# Patient Record
Sex: FEMALE | Race: WHITE | NOT HISPANIC OR LATINO | URBAN - METROPOLITAN AREA
[De-identification: names, ages, dates, MRNs, and addresses within clinical notes are randomized per-mention and may not be internally consistent; named-entity substitution may affect disease eponyms.]

---

## 2017-11-15 PROBLEM — Z00.00 ROUTINE HEALTH MAINTENANCE: Status: ACTIVE | Noted: 2017-11-15

## 2017-11-15 NOTE — PROGRESS NOTES
Isai Byrd is a 34year old female. Patient presents with:  Physical: Pt presents for PE / [address] [place] [address] 10/6/2016 / Pap with GYN / Denies Flu vac       HPI:   Isai Byrd is a 34year old female who presents for a complete physical exam.   The lexapro i otherwise  SKIN: denies any unusual skin lesions  EYES:denies blurred vision or double vision  HEENT: denies nasal congestion, sinus pain or ST  LUNGS: denies shortness of breath with exertion or cough  CARDIOVASCULAR: denies chest pain, pressure, or palpi

## 2017-12-22 NOTE — TELEPHONE ENCOUNTER
I sent in rx for effexor 75mg daily. She should have tapered off lexapro by now per Dr. Hemant Awan recommendation. Will need to check with patient whether she needs the lexapro or not. Attempted to call and LMTCB.

## 2017-12-22 NOTE — TELEPHONE ENCOUNTER
Pt. Is requesting a refill on: Escitalopram & Venlafaxine  Pt. Moved and can't find her Rx's please advise  Ph. # [phone]   [facility] ph. # [phone]   Pt.  Is aware Dr. Jeancarlos Diaz is off & would like on-call to advise

## 2017-12-27 NOTE — TELEPHONE ENCOUNTER
Patient called back and states that she is still in the process of weaning off of Lexapro. At this time she is requesting 7 days worth of medication to finish weaning. eRx for Lexapro 10mg x 7days sent to [place].

## 2018-01-22 NOTE — TELEPHONE ENCOUNTER
[facility] requesting NEW RX for:  Venlafaxine 75MG ER CAP  Tasked to [employer]

## 2018-04-09 NOTE — TELEPHONE ENCOUNTER
Patient is tapering off Venlafaxine. Needs new dosage called into pharmacy. [facility] [phone]  Routed to Rx.

## 2018-04-11 NOTE — TELEPHONE ENCOUNTER
Called patient. She wants to get off of Venlafaxine completely. She does not think that it is helping her. She is wondering how to best taper off of it. She has been taking 75 mg capsules daily.     To Dr Cayla Del Valle please advise

## 2018-04-12 NOTE — TELEPHONE ENCOUNTER
Rec decreasing to the 37.5mg capsules -- then take 1 tab daily for 2 weeks, then 1 tab every other day x 2 weeks, then 1 tab every 2 days for 2 weeks, then stop (order pended)

## 2018-10-15 NOTE — TELEPHONE ENCOUNTER
Sx onset: Saturday night. Reports flu like symptoms. C/o chills, sweating, body aches, and dry cough. Felt feverish yesterday. Doesn't have a thermometer to take her temp. Denies nasal or sinus congestion.      To on call MD: send to [facility] on [address]

## 2018-10-15 NOTE — TELEPHONE ENCOUNTER
Called and Relayed MD's message to patient---verbalized understanding. States she feels very weak and doesn't think that she would be able to come in for an appt tomorrow. She did agree to schedule an appt for 3:30 and see how she feels tomorrow.  She may c

## 2018-10-15 NOTE — TELEPHONE ENCOUNTER
Pt has come down with the flu and is wondering if Dr Heri Becerra can prescribe her something over the phone.  Best call back is [phone]

## 2018-10-15 NOTE — TELEPHONE ENCOUNTER
From what I understand there is no flu going around just yet, doctor blackmon is in tomorrow, I recommend we try to get her in tomorrow with either her myself to be evaluated.  Also maybe a flu shot

## 2018-10-16 NOTE — PROGRESS NOTES
Greta Romero is a 28year old female. Patient presents with:  Cough: Dry cough, sweats at night, chills and body aches since last Friday. She is taking tylenol. HPI:     Sick since last Friday. Developed fatigue and chills, tactile fever.   The nex auscultation, no crackles or wheezing. CARDIO: RRR, normal S1S2, without murmur or gallop  GI: soft, NT, ND, NABS, no HSM    ASSESSMENT AND PLAN:     1. URI, acute  prob viral.  Seems to be improving today. Cont to push fluids. Prn tylenol.   Call if sx

## 2020-12-08 NOTE — TELEPHONE ENCOUNTER
Patient is calling on Saturday evening her back went out  She is still very uncomfortable, she can't lay flat or move with quick motion  she has been using a heating pad    Patient would like to get a rx for a muscle relaxer called into [facility] [address]

## 2020-12-08 NOTE — TELEPHONE ENCOUNTER
I spoke with patient. She is having some tightness in her back since this weekend. Her niece was going up stairs and she made a sudden movement and then felt her back tighten up. She has tried heating pad, tylenol and aleve.  Last night she could not sleep

## 2020-12-09 NOTE — TELEPHONE ENCOUNTER
Sorry forgot to route this to you yesterday. Rx pended for flexeril.   Please send to whichever pharmacy she prefers

## 2021-03-04 NOTE — PROGRESS NOTES
Miky Rhodes is a 39year old female. Patient presents with:  Abscess: h/o steve-rectal abscess years ago in college. reports blood with wiping. felt a bubble a couple weeks ago, then resolved. c/o pus drainage. No fever/chills.      HPI:     A couple of fluctuance but no opening/pore/drainage. Digital rectal exam negative for palpable mass or any significant tenderness. Stool soft, light brown.     ASSESSMENT AND PLAN:     Perianal abscess (?)  -pt with episode of pain followed by drainage about 2 weeks

## 2021-06-25 NOTE — TELEPHONE ENCOUNTER
Looks like she had a mammo done 6/15/21 (at [address]) -- they are recommending a repeat left breast mammo in 6 months. Looks like an NP [facility] [place]) ordered it -- please confirm with pt that that is the case.   Did the

## 2021-06-25 NOTE — TELEPHONE ENCOUNTER
FAISAL Ledbetter to patient and relayed MD message.  Patient verbalized understanding and confirmed that she will call the NP's office to find out if order was placed for 6 month f/u mammogram. Pt states she plans to do the next mammo at the same location

## 2021-11-19 NOTE — TELEPHONE ENCOUNTER
Pt is calling and states she just did a at home Covid test and it came back positive. Pt states she has a cough nothing else.     Pt would like to get a covid test

## 2022-08-31 PROBLEM — E53.8 VITAMIN B12 DEFICIENCY: Status: ACTIVE | Noted: 2022-08-31

## 2022-09-01 NOTE — TELEPHONE ENCOUNTER
To Dr. Xiomara Giron to advise-- lab results attached under scanned media. Updated care gaps with pap result.

## 2022-09-01 NOTE — TELEPHONE ENCOUNTER
From: Daya Grissom  To: Elliot James MD  Sent: 9/1/2022 11:12 AM CDT  Subject: Lab results from 05/24/2022    Hi Dr. Maddie Gary, it was great to see you yesterday! Attached are all my lab results from my [facility]. Their system was down so they emailed them to me. Let me know if you see anything off or if I should do anymore labs.      Thanks,  [employer]

## 2024-08-22 ENCOUNTER — TELEPHONE (OUTPATIENT)
Dept: UROGYNECOLOGY | Age: 39
End: 2024-08-22

## 2024-08-22 ENCOUNTER — V-VISIT (OUTPATIENT)
Dept: UROGYNECOLOGY | Age: 39
End: 2024-08-22

## 2024-08-22 ENCOUNTER — OFFICE VISIT (OUTPATIENT)
Dept: INTERNAL MEDICINE CLINIC | Facility: CLINIC | Age: 39
End: 2024-08-22
Payer: COMMERCIAL

## 2024-08-22 VITALS
HEIGHT: 63 IN | TEMPERATURE: 98 F | HEART RATE: 90 BPM | DIASTOLIC BLOOD PRESSURE: 78 MMHG | BODY MASS INDEX: 31.01 KG/M2 | SYSTOLIC BLOOD PRESSURE: 124 MMHG | OXYGEN SATURATION: 99 % | WEIGHT: 175 LBS

## 2024-08-22 DIAGNOSIS — R93.5 ABNORMAL ENDOMETRIAL ULTRASOUND: ICD-10-CM

## 2024-08-22 DIAGNOSIS — N85.6 INTRAUTERINE ADHESIONS: Primary | ICD-10-CM

## 2024-08-22 DIAGNOSIS — F41.1 GENERALIZED ANXIETY DISORDER: ICD-10-CM

## 2024-08-22 DIAGNOSIS — E78.00 HYPERCHOLESTEROLEMIA: ICD-10-CM

## 2024-08-22 DIAGNOSIS — E53.8 VITAMIN B12 DEFICIENCY: ICD-10-CM

## 2024-08-22 DIAGNOSIS — Z00.00 ROUTINE HEALTH MAINTENANCE: Primary | ICD-10-CM

## 2024-08-22 PROCEDURE — 3078F DIAST BP <80 MM HG: CPT | Performed by: INTERNAL MEDICINE

## 2024-08-22 PROCEDURE — 3008F BODY MASS INDEX DOCD: CPT | Performed by: INTERNAL MEDICINE

## 2024-08-22 PROCEDURE — 3074F SYST BP LT 130 MM HG: CPT | Performed by: INTERNAL MEDICINE

## 2024-08-22 PROCEDURE — 99396 PREV VISIT EST AGE 40-64: CPT | Performed by: INTERNAL MEDICINE

## 2024-08-22 RX ORDER — ESTRADIOL VALERATE 20 MG/ML
INJECTION INTRAMUSCULAR
COMMUNITY
Start: 2024-03-15

## 2024-08-22 RX ORDER — CLOBETASOL PROPIONATE 0.5 MG/G
1 CREAM TOPICAL 2 TIMES DAILY
Qty: 45 G | Refills: 0 | Status: SHIPPED | OUTPATIENT
Start: 2024-08-22

## 2024-08-22 RX ORDER — MISOPROSTOL 200 UG/1
TABLET ORAL
Qty: 4 TABLET | Refills: 0 | Status: SHIPPED | OUTPATIENT
Start: 2024-08-22

## 2024-08-22 RX ORDER — NORGESTIMATE AND ETHINYL ESTRADIOL 0.25-0.035
1 KIT ORAL DAILY
COMMUNITY
Start: 2024-06-25

## 2024-08-22 RX ORDER — PROGESTERONE 50 MG/ML
INJECTION, SOLUTION INTRAMUSCULAR
COMMUNITY
Start: 2024-03-15

## 2024-08-22 RX ORDER — KETOROLAC TROMETHAMINE 10 MG/1
TABLET, FILM COATED ORAL
Qty: 4 TABLET | Refills: 0 | Status: SHIPPED | OUTPATIENT
Start: 2024-08-22

## 2024-08-22 RX ORDER — ESTRADIOL 2 MG/1
TABLET ORAL
COMMUNITY
Start: 2024-03-15

## 2024-08-22 RX ORDER — LETROZOLE 2.5 MG/1
TABLET, FILM COATED ORAL
COMMUNITY
Start: 2024-06-05

## 2024-08-22 RX ORDER — CHORIOGONADOTROPIN ALFA 250 UG/.5ML
INJECTION, SOLUTION SUBCUTANEOUS
COMMUNITY
Start: 2024-06-06

## 2024-08-22 RX ORDER — PROGESTERONE 200 MG/1
CAPSULE ORAL
COMMUNITY
Start: 2024-06-05

## 2024-08-22 NOTE — PROGRESS NOTES
Manisha Fernandez is a 39 year old female.  Chief Complaint   Patient presents with    Physical     Pt here for annual physical exam        HPI:   Manisha Fernandez (formerly Kwasi) is a 39 year old female who presents for a complete physical exam.      Trying to get pregnant x 2 years    Ultrasound showed endometrial fibroid.  Had hysteroscopic resection of EM fibroid (leiomyoma) on 4/12/23 (gyne Dr. Janet Russell) at .  Then hysteroscopy w/uterine polypectomy 11/13/23  Went thru 3 cycles of IVF; had 2 embryos in storage.  Saw repro endo Dr. Toya Cabezas at   Had embryo transfer 7/23/24 - unsuccessful due to possible scar tissue and thin endometrium.  Saw Dr. Villa Hernández (urogyne) thru [facility] earlier today -- to undergo hysteroscopy w/possible release of uterine synechia/adhesions and endometrial sampling  Trying for one more egg retrieval in 9/2024 (Dr. Jv Chapman at [facility])  They have one remaining embryo currently; considering using a surrogate.    On PNV, vit D, vit C, coQ10 (to help w/egg quality)    Exercising regularly; working out w/a     Requests cream for rash on back from fertility shots      Wt Readings from Last 6 Encounters:   08/22/24 175 lb (79.4 kg)   08/31/22 171 lb (77.6 kg)   03/04/21 166 lb (75.3 kg)   10/16/18 165 lb (74.8 kg)   11/15/17 162 lb (73.5 kg)   10/06/16 145 lb (65.8 kg)     Body mass index is 31 kg/m².       Current Outpatient Medications   Medication Sig Dispense Refill    Cholecalciferol (VITAMIN D) 25 MCG (1000 UT) Oral Tab Take 1,000 Units by mouth daily. 1 tablet 0      Past Medical History:    C. difficile colitis    Marika-rectal abscess    fistulotomy - 2007    Seasonal allergies      No past surgical history on file.   Family History   Problem Relation Age of Onset    Heart Disease Father         CAD    Stroke Father         CVA - cause of death    Alcohol and Other Disorders Associated Father         alcoholism    Colon Cancer Maternal  Grandfather     Other (SLE) Mother     Cancer Mother         Breast Cancer (2018)    Thyroid Disorder Other         family h/o      Social History:   Social History     Socioeconomic History    Marital status: Single   Tobacco Use    Smoking status: Never    Smokeless tobacco: Never   Vaping Use    Vaping status: Never Used   Substance and Sexual Activity    Alcohol use: Yes     Alcohol/week: 0.0 standard drinks of alcohol     Comment: beer - socially    Drug use: No    Sexual activity: Yes     Birth control/protection: I.U.D.     Social Determinants of Health      Received from [facility]    Social Connections    Received from [facility]    Housing Stability          REVIEW OF SYSTEMS:   GENERAL: feels well otherwise  SKIN: denies any unusual skin lesions  EYES:denies blurred vision or double vision  HEENT: denies nasal congestion, sinus pain or ST  LUNGS: denies shortness of breath with exertion or cough  CARDIOVASCULAR: denies chest pain, pressure, or palpitations  GI: denies abdominal pain, nausea, vomiting, diarrhea, constipation, hematochezia, or melena  NEURO: denies headaches or dizziness    EXAM:   /78   Pulse 90   Temp 97.9 °F (36.6 °C)   Ht 5' 3\" (1.6 m)   Wt 175 lb (79.4 kg)   SpO2 99%   BMI 31.00 kg/m²     GENERAL: well developed, well nourished, in no apparent distress  HEENT: normal oropharynx, impacted cerumen R ear canal, normal L ear canal and TM  EYES: PERRLA, EOMI, conjunctivae are pink  NECK: supple, no cervical or supraclavicular LAD, no carotid bruits  BREAST: no dominant or suspicious mass, no axillary LAD  LUNGS: clear to auscultation  CARDIO: RRR, normal S1S2, no gallops or murmurs  GI: soft, NT, ND, NABS, no HSM  EXTREMITIES: no cyanosis, clubbing or edema, +2 DP pulses  Faint rash on right lower back      ASSESSMENT AND PLAN:     Routine health maintenance  Sees gyne at [facility] for Paps (going thru fertility)  Cont exercise  Tdap  5/2012 -- due for repeat; pt opts to wait until fertility  Check labs (LDL was 179 in 1/2024 at gyne office)     Family hx of breast cancer (mom at 62 -- genetic testing negative)   Had mammo thru gyne's office in 1/2024 (normal per pt)    Vitamin B12 deficiency  Check level (not currently taking)    Rash  Irritation from fertility shots  Not better with cortisone  Clobetasol cream    RTC 1 yr

## 2024-08-23 ENCOUNTER — PREP FOR CASE (OUTPATIENT)
Dept: UROGYNECOLOGY | Age: 39
End: 2024-08-23

## 2024-08-23 DIAGNOSIS — R93.5 ABNORMAL ENDOMETRIAL ULTRASOUND: ICD-10-CM

## 2024-08-23 DIAGNOSIS — N85.6 INTRAUTERINE ADHESIONS: Primary | ICD-10-CM

## 2024-08-31 LAB
ABSOLUTE BASOPHILS: 29 CELLS/UL (ref 0–200)
ABSOLUTE EOSINOPHILS: 245 CELLS/UL (ref 15–500)
ABSOLUTE LYMPHOCYTES: 2066 CELLS/UL (ref 850–3900)
ABSOLUTE MONOCYTES: 461 CELLS/UL (ref 200–950)
ABSOLUTE NEUTROPHILS: 4399 CELLS/UL (ref 1500–7800)
ALBUMIN/GLOBULIN RATIO: 1.6 (CALC) (ref 1–2.5)
ALBUMIN: 4.4 G/DL (ref 3.6–5.1)
ALKALINE PHOSPHATASE: 52 U/L (ref 31–125)
ALT: 22 U/L (ref 6–29)
AST: 20 U/L (ref 10–30)
BASOPHILS: 0.4 %
BILIRUBIN, TOTAL: 0.4 MG/DL (ref 0.2–1.2)
BUN: 13 MG/DL (ref 7–25)
CALCIUM: 9.5 MG/DL (ref 8.6–10.2)
CARBON DIOXIDE: 26 MMOL/L (ref 20–32)
CHLORIDE: 103 MMOL/L (ref 98–110)
CHOL/HDLC RATIO: 4.4 (CALC)
CHOLESTEROL, TOTAL: 221 MG/DL
CREATININE: 0.71 MG/DL (ref 0.5–0.99)
EGFR: 110 ML/MIN/1.73M2
EOSINOPHILS: 3.4 %
GLOBULIN: 2.7 G/DL (CALC) (ref 1.9–3.7)
GLUCOSE: 87 MG/DL (ref 65–99)
HDL CHOLESTEROL: 50 MG/DL
HEMATOCRIT: 44.5 % (ref 35–45)
HEMOGLOBIN: 14.2 G/DL (ref 11.7–15.5)
LDL-CHOLESTEROL: 152 MG/DL (CALC)
LYMPHOCYTES: 28.7 %
MCH: 31.8 PG (ref 27–33)
MCHC: 31.9 G/DL (ref 32–36)
MCV: 99.6 FL (ref 80–100)
MONOCYTES: 6.4 %
MPV: 10.3 FL (ref 7.5–12.5)
NEUTROPHILS: 61.1 %
NON-HDL CHOLESTEROL: 171 MG/DL (CALC)
PLATELET COUNT: 315 THOUSAND/UL (ref 140–400)
POTASSIUM: 4.3 MMOL/L (ref 3.5–5.3)
PROTEIN, TOTAL: 7.1 G/DL (ref 6.1–8.1)
RDW: 11.7 % (ref 11–15)
RED BLOOD CELL COUNT: 4.47 MILLION/UL (ref 3.8–5.1)
SODIUM: 139 MMOL/L (ref 135–146)
TRIGLYCERIDES: 89 MG/DL
TSH: 2.12 MIU/L
VITAMIN B12: 598 PG/ML (ref 200–1100)
VITAMIN D, 25-OH, TOTAL: 46 NG/ML (ref 30–100)
WHITE BLOOD CELL COUNT: 7.2 THOUSAND/UL (ref 3.8–10.8)

## 2024-09-24 ENCOUNTER — TELEPHONE (OUTPATIENT)
Dept: UROGYNECOLOGY | Age: 39
End: 2024-09-24

## 2024-09-30 ENCOUNTER — OFFICE VISIT (OUTPATIENT)
Dept: INTERNAL MEDICINE CLINIC | Facility: CLINIC | Age: 39
End: 2024-09-30

## 2024-09-30 ENCOUNTER — TELEPHONE (OUTPATIENT)
Dept: INTERNAL MEDICINE CLINIC | Facility: CLINIC | Age: 39
End: 2024-09-30

## 2024-09-30 ENCOUNTER — LAB ENCOUNTER (OUTPATIENT)
Dept: LAB | Age: 39
End: 2024-09-30
Attending: INTERNAL MEDICINE
Payer: COMMERCIAL

## 2024-09-30 VITALS
SYSTOLIC BLOOD PRESSURE: 94 MMHG | TEMPERATURE: 98 F | WEIGHT: 176 LBS | DIASTOLIC BLOOD PRESSURE: 72 MMHG | OXYGEN SATURATION: 99 % | HEIGHT: 63 IN | HEART RATE: 104 BPM | BODY MASS INDEX: 31.18 KG/M2

## 2024-09-30 DIAGNOSIS — R82.90 ABNORMAL URINALYSIS: ICD-10-CM

## 2024-09-30 DIAGNOSIS — Z01.818 PRE-OP EVALUATION: Primary | ICD-10-CM

## 2024-09-30 LAB
BILIRUB UR QL: NEGATIVE
CLARITY UR: CLEAR
COLOR UR: COLORLESS
GLUCOSE UR-MCNC: NORMAL MG/DL
HGB UR QL STRIP.AUTO: NEGATIVE
KETONES UR-MCNC: NEGATIVE MG/DL
LEUKOCYTE ESTERASE UR QL STRIP.AUTO: NEGATIVE
NITRITE UR QL STRIP.AUTO: NEGATIVE
PH UR: 6 [PH] (ref 5–8)
PROT UR-MCNC: NEGATIVE MG/DL
SP GR UR STRIP: 1 (ref 1–1.03)
UROBILINOGEN UR STRIP-ACNC: NORMAL

## 2024-09-30 PROCEDURE — 81003 URINALYSIS AUTO W/O SCOPE: CPT | Performed by: INTERNAL MEDICINE

## 2024-09-30 PROCEDURE — 3074F SYST BP LT 130 MM HG: CPT | Performed by: INTERNAL MEDICINE

## 2024-09-30 PROCEDURE — 3008F BODY MASS INDEX DOCD: CPT | Performed by: INTERNAL MEDICINE

## 2024-09-30 PROCEDURE — 87086 URINE CULTURE/COLONY COUNT: CPT | Performed by: INTERNAL MEDICINE

## 2024-09-30 PROCEDURE — 3078F DIAST BP <80 MM HG: CPT | Performed by: INTERNAL MEDICINE

## 2024-09-30 PROCEDURE — 99213 OFFICE O/P EST LOW 20 MIN: CPT | Performed by: INTERNAL MEDICINE

## 2024-09-30 RX ORDER — ASCORBIC ACID 500 MG
TABLET ORAL
COMMUNITY

## 2024-09-30 NOTE — PROGRESS NOTES
Manisha Fernandez is a 39 year old female.  HPI:     Chief Complaint   Patient presents with    Pre-Op Exam     Hysteroscopy 10/4/2024 with Dr. Hernández (Fax #: [phone])      Manisha will be having this Friday a hysteroscopy with endometrial sampling possible release of uterine synechia.    She feels well.  She has no chest pain.  No shortness of breath.  She works with a  twice a week with weights.  She does aerobics called \"[employer]\".  She is asymptomatic.  No chest pain.    Family history is remarkable for father having passed away at 54 of a stroke.  He had alcohol problems.  Mother is alive at 67.  She has SLE.  She has a history of breast carcinoma.  She has 1 brother alive and well    On preop form it indicates CBC and CMP however she recently had this during her preop evaluation and labs were done August 31.  CBC unremarkable and CMP unremarkable.    Also there was a urine culture regardless of infection noted on her preop form so we will get that today.    I did review Dr. Blanco's note from August 22, 2022.  Current Outpatient Medications   Medication Sig Dispense Refill    ascorbic acid (C 500) 500 MG Oral Tab       Prenatal MV-Min-Fe Fum-FA-DHA (PRENATAL/FOLIC ACID+DHA OR)       Cholecalciferol (VITAMIN D-3 OR) Take by mouth daily.      clobetasol 0.05 % External Cream Apply 1 Application topically 2 (two) times daily. 45 g 0      Past Medical History:    Allergic rhinitis    C. difficile colitis    Marika-rectal abscess    fistulotomy - 2007    Seasonal allergies      Social History:  Social History     Socioeconomic History    Marital status: Single   Tobacco Use    Smoking status: Never    Smokeless tobacco: Never   Vaping Use    Vaping status: Never Used   Substance and Sexual Activity    Alcohol use: Yes     Alcohol/week: 5.0 standard drinks of alcohol     Types: 5 Standard drinks or equivalent per week     Comment: beer - socially    Drug use: No    Sexual activity: Yes      Birth control/protection: I.U.D.     Social Determinants of Health      Received from [facility]    Social Connections    Received from [facility]    Housing Stability        REVIEW OF SYSTEMS:   GENERAL HEALTH:  feels well otherwise  RESPIRATORY:  Voices no shortness of breath with exertion or cough  CARDIOVASCULAR:  Voices no chest pain on exertion or shortness of breath  GI:   Voices no abdominal pain or changes of bowels   :Viices no urning or frequency of urination acutely.  Preoperative note indicates urine culture regardless of no infection to be done.  NEURO:  Voices no  headaches or dizziness    EXAM:   BP 94/72   Pulse 104   Temp 98.4 °F (36.9 °C) (Oral)   Ht 5' 3\" (1.6 m)   Wt 176 lb (79.8 kg)   SpO2 99%   BMI 31.18 kg/m²     GENERAL:  well developed, well nourished, in no apparent distress  SKIN:  no rashes ,  HEENT: atraumatic.  Pharynx normal without exudate.  EYES:  PERRL. Sclera anicteric.  NECK:  Supple,  no adenopathy,    LUNGS:  clear to auscultation.  Effort normal  CARDIO:  RRR without murmur.   S1 and S2 normal  GI:  good BS's,  no masses,   HSM or tenderness  EXTREMITIES : no cyanosis, clubbing or edema    ASSESSMENT AND PLAN:     1. Pre-op evaluation  Manisha is at acceptable risk for her outpatient gynecological surgery.  She has no cardiac complaints.  No family history of coronary artery disease.  She exercises without cardiac symptoms.  She is cleared for her outpatient hysteroscopy.  - Urinalysis, Routine  - Urine Culture, Routine    2. Abnormal urinalysis  Per preop evaluation will get urinalysis and urine culture.    This visit was 20 minutes.  I spent 10 minutes before visit preparing and reviewing old records.  Greater than 50% of the visit was engaged in counseling and review of past data.    I reviewed Dr. Blanco's office visit note along with CBC CMP vitamin D TSH lipid and vitamin B12 all done on August 31 of this year.  I did  call [facility] to inquire about the urine culture request but needed to leave a message.    The patient indicates understanding of these issues and agrees to the plan.    Eh Crouch MD  9/30/2024  11:23 AM    Addendum: September 30,025 2:44 PM.  Urinalysis negative.  No signs of infection.  Urine culture pending.  My clinical intuition is that urine culture will be negative.    Therefore barring any urine culture abnormalities Manisha is cleared for her gynecological surgery.    Electronically signed by Dr. Eh Crouch September 30, 2024 at 2:45 PM

## 2024-09-30 NOTE — TELEPHONE ENCOUNTER
Left message on patient's cell (OK per HIPPA) relaying MD message. Advised in voicemail to call back with any questions or concerns.

## 2024-09-30 NOTE — TELEPHONE ENCOUNTER
Please let Manisha know that her urinalysis came out good.  I successfully faxed her preop evaluation to .  I doubt that anything will show up on \"culture\" but if it does we will let her know.

## 2024-10-03 ASSESSMENT — ACTIVITIES OF DAILY LIVING (ADL)
SENSORY_SUPPORT_DEVICES: EYEGLASSES
NEEDS_ASSIST: NO
ADL_SHORT_OF_BREATH: NO
RECENT_DECLINE_ADL: NO

## 2024-10-04 ENCOUNTER — ANESTHESIA EVENT (OUTPATIENT)
Dept: SURGERY | Age: 39
End: 2024-10-04

## 2024-10-04 ENCOUNTER — ANESTHESIA (OUTPATIENT)
Dept: SURGERY | Age: 39
End: 2024-10-04

## 2024-10-04 ENCOUNTER — HOSPITAL ENCOUNTER (OUTPATIENT)
Age: 39
Discharge: HOME | End: 2024-10-04
Attending: OBSTETRICS & GYNECOLOGY | Admitting: OBSTETRICS & GYNECOLOGY

## 2024-10-04 VITALS
OXYGEN SATURATION: 98 % | BODY MASS INDEX: 30.11 KG/M2 | DIASTOLIC BLOOD PRESSURE: 76 MMHG | HEIGHT: 64 IN | RESPIRATION RATE: 15 BRPM | SYSTOLIC BLOOD PRESSURE: 113 MMHG | WEIGHT: 176.37 LBS | TEMPERATURE: 97.2 F | HEART RATE: 73 BPM

## 2024-10-04 DIAGNOSIS — R93.5 ABNORMAL ENDOMETRIAL ULTRASOUND: ICD-10-CM

## 2024-10-04 DIAGNOSIS — N85.6 INTRAUTERINE ADHESIONS: ICD-10-CM

## 2024-10-04 LAB
B-HCG UR QL: NEGATIVE
INTERNAL PROCEDURAL CONTROLS ACCEPTABLE: YES
TEST LOT EXPIRATION DATE: NORMAL
TEST LOT NUMBER: NORMAL

## 2024-10-04 PROCEDURE — 13000001 HB PHASE II RECOVERY EA 30 MINUTES: Performed by: OBSTETRICS & GYNECOLOGY

## 2024-10-04 PROCEDURE — 13000002 HB ANESTHESIA  GENERAL  S/U + 1ST 15 MIN: Performed by: OBSTETRICS & GYNECOLOGY

## 2024-10-04 PROCEDURE — 10002800 HB RX 250 W HCPCS

## 2024-10-04 PROCEDURE — 81025 URINE PREGNANCY TEST: CPT | Performed by: OBSTETRICS & GYNECOLOGY

## 2024-10-04 PROCEDURE — 88305 TISSUE EXAM BY PATHOLOGIST: CPT | Performed by: OBSTETRICS & GYNECOLOGY

## 2024-10-04 PROCEDURE — 13000036 HB COMPLEX  CASE S/U + 1ST 15 MIN: Performed by: OBSTETRICS & GYNECOLOGY

## 2024-10-04 PROCEDURE — 10004451 HB PACU RECOVERY 1ST 30 MINUTES: Performed by: OBSTETRICS & GYNECOLOGY

## 2024-10-04 PROCEDURE — 10002800 HB RX 250 W HCPCS: Performed by: ANESTHESIOLOGY

## 2024-10-04 PROCEDURE — 10004452 HB PACU ADDL 30 MINUTES: Performed by: OBSTETRICS & GYNECOLOGY

## 2024-10-04 PROCEDURE — 13000003 HB ANESTHESIA  GENERAL EA ADD MINUTE: Performed by: OBSTETRICS & GYNECOLOGY

## 2024-10-04 PROCEDURE — 10002801 HB RX 250 W/O HCPCS: Performed by: ANESTHESIOLOGY

## 2024-10-04 PROCEDURE — 13000037 HB COMPLEX CASE EACH ADD MINUTE: Performed by: OBSTETRICS & GYNECOLOGY

## 2024-10-04 PROCEDURE — 10006023 HB SUPPLY 272: Performed by: OBSTETRICS & GYNECOLOGY

## 2024-10-04 PROCEDURE — 58559 HYSTEROSCOPY LYSIS: CPT | Performed by: OBSTETRICS & GYNECOLOGY

## 2024-10-04 PROCEDURE — 10002807 HB RX 258: Performed by: ANESTHESIOLOGY

## 2024-10-04 RX ORDER — SODIUM CHLORIDE, SODIUM LACTATE, POTASSIUM CHLORIDE, CALCIUM CHLORIDE 600; 310; 30; 20 MG/100ML; MG/100ML; MG/100ML; MG/100ML
INJECTION, SOLUTION INTRAVENOUS CONTINUOUS PRN
Status: DISCONTINUED | OUTPATIENT
Start: 2024-10-04 | End: 2024-10-04

## 2024-10-04 RX ORDER — DEXAMETHASONE SODIUM PHOSPHATE 4 MG/ML
INJECTION, SOLUTION INTRA-ARTICULAR; INTRALESIONAL; INTRAMUSCULAR; INTRAVENOUS; SOFT TISSUE PRN
Status: DISCONTINUED | OUTPATIENT
Start: 2024-10-04 | End: 2024-10-04

## 2024-10-04 RX ORDER — LIDOCAINE HYDROCHLORIDE 20 MG/ML
INJECTION, SOLUTION INFILTRATION; PERINEURAL PRN
Status: DISCONTINUED | OUTPATIENT
Start: 2024-10-04 | End: 2024-10-04

## 2024-10-04 RX ORDER — MIDAZOLAM HYDROCHLORIDE 1 MG/ML
INJECTION, SOLUTION INTRAMUSCULAR; INTRAVENOUS PRN
Status: DISCONTINUED | OUTPATIENT
Start: 2024-10-04 | End: 2024-10-04

## 2024-10-04 RX ORDER — KETOROLAC TROMETHAMINE 30 MG/ML
15 INJECTION, SOLUTION INTRAMUSCULAR; INTRAVENOUS ONCE
Status: COMPLETED | OUTPATIENT
Start: 2024-10-04 | End: 2024-10-04

## 2024-10-04 RX ORDER — PROPOFOL 10 MG/ML
INJECTION, EMULSION INTRAVENOUS PRN
Status: DISCONTINUED | OUTPATIENT
Start: 2024-10-04 | End: 2024-10-04

## 2024-10-04 RX ORDER — ONDANSETRON 2 MG/ML
4 INJECTION INTRAMUSCULAR; INTRAVENOUS 2 TIMES DAILY PRN
Status: DISCONTINUED | OUTPATIENT
Start: 2024-10-04 | End: 2024-10-04 | Stop reason: HOSPADM

## 2024-10-04 RX ORDER — ONDANSETRON 2 MG/ML
4 INJECTION INTRAMUSCULAR; INTRAVENOUS
Status: DISCONTINUED | OUTPATIENT
Start: 2024-10-04 | End: 2024-10-04 | Stop reason: HOSPADM

## 2024-10-04 RX ORDER — ONDANSETRON 2 MG/ML
INJECTION INTRAMUSCULAR; INTRAVENOUS PRN
Status: DISCONTINUED | OUTPATIENT
Start: 2024-10-04 | End: 2024-10-04

## 2024-10-04 RX ORDER — LEVOFLOXACIN 500 MG/1
500 TABLET, FILM COATED ORAL DAILY
Qty: 10 TABLET | Refills: 0 | Status: SHIPPED | OUTPATIENT
Start: 2024-10-04 | End: 2024-10-14

## 2024-10-04 RX ORDER — ESTRADIOL 1 MG/1
4 TABLET ORAL DAILY
Qty: 28 TABLET | Refills: 0 | Status: SHIPPED | OUTPATIENT
Start: 2024-10-04 | End: 2024-10-11

## 2024-10-04 RX ADMIN — CEFAZOLIN SODIUM 2000 MG: 300 INJECTION, POWDER, LYOPHILIZED, FOR SOLUTION INTRAVENOUS at 07:50

## 2024-10-04 RX ADMIN — FENTANYL CITRATE 25 MCG: 50 INJECTION INTRAMUSCULAR; INTRAVENOUS at 08:24

## 2024-10-04 RX ADMIN — HYDROMORPHONE HYDROCHLORIDE 0.4 MG: 1 INJECTION, SOLUTION INTRAMUSCULAR; INTRAVENOUS; SUBCUTANEOUS at 08:43

## 2024-10-04 RX ADMIN — KETOROLAC TROMETHAMINE 15 MG: 30 INJECTION, SOLUTION INTRAMUSCULAR at 08:34

## 2024-10-04 RX ADMIN — FENTANYL CITRATE 25 MCG: 50 INJECTION INTRAMUSCULAR; INTRAVENOUS at 08:28

## 2024-10-04 RX ADMIN — FENTANYL CITRATE 25 MCG: 50 INJECTION INTRAMUSCULAR; INTRAVENOUS at 09:04

## 2024-10-04 RX ADMIN — ONDANSETRON 4 MG: 2 INJECTION INTRAMUSCULAR; INTRAVENOUS at 08:08

## 2024-10-04 RX ADMIN — HYDROMORPHONE HYDROCHLORIDE 0.4 MG: 1 INJECTION, SOLUTION INTRAMUSCULAR; INTRAVENOUS; SUBCUTANEOUS at 08:48

## 2024-10-04 RX ADMIN — SODIUM CHLORIDE, POTASSIUM CHLORIDE, SODIUM LACTATE AND CALCIUM CHLORIDE: 600; 310; 30; 20 INJECTION, SOLUTION INTRAVENOUS at 07:25

## 2024-10-04 RX ADMIN — MIDAZOLAM HYDROCHLORIDE 2 MG: 1 INJECTION, SOLUTION INTRAMUSCULAR; INTRAVENOUS at 07:25

## 2024-10-04 RX ADMIN — FENTANYL CITRATE 25 MCG: 50 INJECTION INTRAMUSCULAR; INTRAVENOUS at 08:29

## 2024-10-04 RX ADMIN — FENTANYL CITRATE 25 MCG: 50 INJECTION INTRAMUSCULAR; INTRAVENOUS at 07:33

## 2024-10-04 RX ADMIN — HYDROMORPHONE HYDROCHLORIDE 0.2 MG: 1 INJECTION, SOLUTION INTRAMUSCULAR; INTRAVENOUS; SUBCUTANEOUS at 08:58

## 2024-10-04 RX ADMIN — LIDOCAINE HYDROCHLORIDE 4 ML: 20 INJECTION, SOLUTION INFILTRATION; PERINEURAL at 07:33

## 2024-10-04 RX ADMIN — DEXAMETHASONE SODIUM PHOSPHATE 4 MG: 4 INJECTION INTRA-ARTICULAR; INTRALESIONAL; INTRAMUSCULAR; INTRAVENOUS; SOFT TISSUE at 07:39

## 2024-10-04 RX ADMIN — FENTANYL CITRATE 25 MCG: 50 INJECTION INTRAMUSCULAR; INTRAVENOUS at 07:52

## 2024-10-04 RX ADMIN — PROPOFOL 200 MG: 10 INJECTION, EMULSION INTRAVENOUS at 07:33

## 2024-10-04 ASSESSMENT — PAIN SCALES - GENERAL
PAINLEVEL_OUTOF10: 10
PAINLEVEL_OUTOF10: 3
PAINLEVEL_OUTOF10: 9
PAINLEVEL_OUTOF10: 0
PAINLEVEL_OUTOF10: 3
PAINLEVEL_OUTOF10: 4

## 2024-10-04 ASSESSMENT — ENCOUNTER SYMPTOMS: EXERCISE TOLERANCE: GOOD (>4 METS)

## 2024-10-06 ENCOUNTER — TELEPHONE (OUTPATIENT)
Dept: OTHER | Age: 39
End: 2024-10-06

## 2024-10-06 ENCOUNTER — NURSE TRIAGE (OUTPATIENT)
Dept: TELEHEALTH | Age: 39
End: 2024-10-06

## 2024-10-07 ENCOUNTER — TELEPHONE (OUTPATIENT)
Dept: UROGYNECOLOGY | Age: 39
End: 2024-10-07

## 2024-10-07 VITALS
SYSTOLIC BLOOD PRESSURE: 113 MMHG | WEIGHT: 176.37 LBS | RESPIRATION RATE: 15 BRPM | HEIGHT: 64 IN | OXYGEN SATURATION: 98 % | BODY MASS INDEX: 30.11 KG/M2 | DIASTOLIC BLOOD PRESSURE: 76 MMHG | HEART RATE: 73 BPM | TEMPERATURE: 97.2 F

## 2024-10-07 LAB
ASR DISCLAIMER: NORMAL
CASE RPRT: NORMAL
CLINICAL INFO: NORMAL
PATH REPORT.FINAL DX SPEC: NORMAL
PATH REPORT.GROSS SPEC: NORMAL

## 2024-10-08 ENCOUNTER — E-ADVICE (OUTPATIENT)
Dept: UROGYNECOLOGY | Age: 39
End: 2024-10-08

## 2024-10-10 ENCOUNTER — E-ADVICE (OUTPATIENT)
Dept: UROGYNECOLOGY | Age: 39
End: 2024-10-10

## 2024-10-10 ENCOUNTER — OFFICE VISIT (OUTPATIENT)
Dept: UROGYNECOLOGY | Age: 39
End: 2024-10-10

## 2024-10-10 VITALS
RESPIRATION RATE: 16 BRPM | HEART RATE: 76 BPM | TEMPERATURE: 97.5 F | SYSTOLIC BLOOD PRESSURE: 111 MMHG | HEIGHT: 64 IN | WEIGHT: 175 LBS | DIASTOLIC BLOOD PRESSURE: 78 MMHG | BODY MASS INDEX: 29.88 KG/M2

## 2024-10-10 DIAGNOSIS — Z09 SURGICAL FOLLOW-UP CARE: Primary | ICD-10-CM

## 2024-10-10 PROCEDURE — 99024 POSTOP FOLLOW-UP VISIT: CPT | Performed by: PHYSICIAN ASSISTANT

## 2024-10-10 ASSESSMENT — PAIN SCALES - GENERAL: PAINLEVEL: 0

## 2024-10-11 ENCOUNTER — PREP FOR CASE (OUTPATIENT)
Dept: UROGYNECOLOGY | Age: 39
End: 2024-10-11

## 2024-10-11 DIAGNOSIS — N85.6 INTRAUTERINE ADHESIONS: Primary | ICD-10-CM

## 2024-10-25 ENCOUNTER — TELEPHONE (OUTPATIENT)
Dept: UROGYNECOLOGY | Age: 39
End: 2024-10-25

## 2024-10-29 ENCOUNTER — TELEPHONE (OUTPATIENT)
Dept: INTERNAL MEDICINE CLINIC | Facility: CLINIC | Age: 39
End: 2024-10-29

## 2024-10-29 ENCOUNTER — OFFICE VISIT (OUTPATIENT)
Dept: INTERNAL MEDICINE CLINIC | Facility: CLINIC | Age: 39
End: 2024-10-29

## 2024-10-29 ENCOUNTER — LAB ENCOUNTER (OUTPATIENT)
Dept: LAB | Age: 39
End: 2024-10-29
Attending: INTERNAL MEDICINE
Payer: COMMERCIAL

## 2024-10-29 VITALS
HEART RATE: 68 BPM | TEMPERATURE: 99 F | SYSTOLIC BLOOD PRESSURE: 104 MMHG | BODY MASS INDEX: 31.18 KG/M2 | HEIGHT: 63 IN | OXYGEN SATURATION: 98 % | WEIGHT: 176 LBS | DIASTOLIC BLOOD PRESSURE: 64 MMHG

## 2024-10-29 DIAGNOSIS — Z01.818 PREOP EXAMINATION: ICD-10-CM

## 2024-10-29 DIAGNOSIS — Z11.1 SCREENING-PULMONARY TB: Primary | ICD-10-CM

## 2024-10-29 DIAGNOSIS — O34.29: ICD-10-CM

## 2024-10-29 PROCEDURE — 86480 TB TEST CELL IMMUN MEASURE: CPT | Performed by: INTERNAL MEDICINE

## 2024-10-29 PROCEDURE — 36415 COLL VENOUS BLD VENIPUNCTURE: CPT | Performed by: INTERNAL MEDICINE

## 2024-10-29 PROCEDURE — 3074F SYST BP LT 130 MM HG: CPT | Performed by: INTERNAL MEDICINE

## 2024-10-29 PROCEDURE — 99214 OFFICE O/P EST MOD 30 MIN: CPT | Performed by: INTERNAL MEDICINE

## 2024-10-29 PROCEDURE — 3008F BODY MASS INDEX DOCD: CPT | Performed by: INTERNAL MEDICINE

## 2024-10-29 PROCEDURE — 3078F DIAST BP <80 MM HG: CPT | Performed by: INTERNAL MEDICINE

## 2024-10-29 NOTE — PROGRESS NOTES
Manisha Fernandez is a 39 year old female.  Chief Complaint   Patient presents with    Pre-Op Exam     Patient is here today for a presurgical physical. She scheduled for a hysteroscopy on 11/1/24. This will be her 4th hysteroscopy in the past 1.5 years. Patient states that she is feeling good; no issues with pain.      HPI:     Pre-op eval for hysteroscopy #4 by Dr. Villa Hernández (urogyne) at [facility]    Trying to get pregnant x 2 years    Ultrasound showed endometrial fibroid.  Had hysteroscopic resection of EM fibroid (leiomyoma) on 4/12/23 (gyne Dr. Janet Russell) at .  Then hysteroscopy w/uterine polypectomy 11/13/23  Went thru 3 cycles of IVF; had 2 embryos in storage.  Saw repro endo Dr. Toya Cabezas at   Had embryo transfer 7/23/24 - unsuccessful due to possible scar tissue and thin endometrium.  Saw Dr. Villa Hernández (urogyne) thru [facility] on 8/22/24 -- s/p hysteroscopy w/release of uterine synechia/adhesions and endometrial sampling on 10/4/24 (a lot of scar tissue) - had a post-op uterine catheter x 1 week to prevent regrowth of uterine fibroids.  One remaining embryo currently; considering using a surrogate.    This upcoming hysteroscopy is for surveillance to ensure that no scar tissue regrew.  If negative, will plan for embryo transfer in 1/2025.    No CP or SOB.      Also filling out adoption application      Current Outpatient Medications   Medication Sig Dispense Refill    ascorbic acid (C 500) 500 MG Oral Tab       Prenatal MV-Min-Fe Fum-FA-DHA (PRENATAL/FOLIC ACID+DHA OR)       Cholecalciferol (VITAMIN D-3 OR) Take by mouth daily.      clobetasol 0.05 % External Cream Apply 1 Application topically 2 (two) times daily. 45 g 0      Past Medical History:    Allergic rhinitis    C. difficile colitis    Marika-rectal abscess    fistulotomy - 2007    Seasonal allergies      Social History:  Social History     Socioeconomic History    Marital status: Single   Tobacco Use    Smoking  status: Never    Smokeless tobacco: Never   Vaping Use    Vaping status: Never Used   Substance and Sexual Activity    Alcohol use: Yes     Alcohol/week: 5.0 standard drinks of alcohol     Types: 5 Standard drinks or equivalent per week     Comment: beer - socially    Drug use: No    Sexual activity: Yes     Birth control/protection: I.U.D.     Social Drivers of Health      Received from [facility]    Social Connections    Received from [facility]    Housing Stability        REVIEW OF SYSTEMS:   GENERAL HEALTH: feels well otherwise  RESPIRATORY: no SOB  CARDIOVASCULAR: no chest pain/pressure  GI: no nausea, vomiting, diarrhea    Wt Readings from Last 5 Encounters:   10/29/24 176 lb (79.8 kg)   09/30/24 176 lb (79.8 kg)   08/22/24 175 lb (79.4 kg)   08/31/22 171 lb (77.6 kg)   03/04/21 166 lb (75.3 kg)     Body mass index is 31.18 kg/m².      EXAM:   /64 (BP Location: Right arm, Patient Position: Sitting, Cuff Size: adult)   Pulse 68   Temp 98.8 °F (37.1 °C) (Oral)   Ht 5' 3\" (1.6 m)   Wt 176 lb (79.8 kg)   LMP 10/22/2024 (Exact Date)   SpO2 98%   BMI 31.18 kg/m²   GENERAL: well developed, well nourished, in no apparent distress    NECK: supple, no adenopathy, no bruits  LUNGS: clear to auscultation  CARDIO: RRR, normal S1S2, without murmur or gallop  GI: soft, NT, ND, NABS  EXTREMITIES: no LE edema, +2 DP pulses    ASSESSMENT AND PLAN:       Uterine scar from previous surgery affecting pregnancy (HCC)  Preop examination  -pt asymptomatic from a cardiopulm standpoint  -vitals normal  -very good exercise tolerance  -recent CBC, CMP normal  -pt is deemed low risk for hysteroscopy    Adoption evaluation  -paperwork filled out  -needs quant gold for TB screen - ordered    Opts to wait on flu shot until after upcoming surgery    The patient indicates understanding of these issues and agrees to the plan.    Kyleigh Blanco MD, 10/29/24, 2:47 PM

## 2024-10-30 NOTE — TELEPHONE ENCOUNTER
Presurgical clearance (including 10/29/24 OV) faxed to surgeon Dr. Hernández at [phone]. Fax confirmation received. Original sent to scanning.

## 2024-10-31 LAB
M TB IFN-G CD4+ T-CELLS BLD-ACNC: 0.02 IU/ML
M TB TUBERC IFN-G BLD QL: NEGATIVE
M TB TUBERC IGNF/MITOGEN IGNF CONTROL: >10 IU/ML
QFT TB1 AG MINUS NIL: 0 IU/ML
QFT TB2 AG MINUS NIL: 0.01 IU/ML

## 2024-10-31 RX ORDER — KETOROLAC TROMETHAMINE 10 MG/1
TABLET, FILM COATED ORAL
Qty: 4 TABLET | Refills: 0 | OUTPATIENT
Start: 2024-10-31

## 2024-10-31 RX ORDER — MISOPROSTOL 200 UG/1
TABLET ORAL
Qty: 4 TABLET | Refills: 0 | OUTPATIENT
Start: 2024-10-31

## 2024-10-31 SDOH — SOCIAL STABILITY: SOCIAL INSECURITY: HOW OFTEN DOES ANYONE, INCLUDING FAMILY AND FRIENDS, THREATEN YOU WITH HARM?: NEVER

## 2024-10-31 SDOH — SOCIAL STABILITY: SOCIAL INSECURITY: HOW OFTEN DOES ANYONE, INCLUDING FAMILY AND FRIENDS, PHYSICALLY HURT YOU?: NEVER

## 2024-10-31 SDOH — SOCIAL STABILITY: SOCIAL INSECURITY: HOW OFTEN DOES ANYONE, INCLUDING FAMILY AND FRIENDS, SCREAM OR CURSE AT YOU?: NEVER

## 2024-10-31 SDOH — SOCIAL STABILITY: SOCIAL INSECURITY: HOW OFTEN DOES ANYONE, INCLUDING FAMILY AND FRIENDS, INSULT OR TALK DOWN TO YOU?: NEVER

## 2024-10-31 ASSESSMENT — ACTIVITIES OF DAILY LIVING (ADL)
HISTORY OF FALLING IN THE LAST YEAR (PRIOR TO ADMISSION): NO
ADL_SCORE: 12
CHRONIC_PAIN_PRESENT: NO
RECENT_DECLINE_ADL: NO
NEEDS_ASSIST: NO
SENSORY_SUPPORT_DEVICES: CONTACTS;EYEGLASSES
ADL_SHORT_OF_BREATH: NO
ADL_BEFORE_ADMISSION: INDEPENDENT

## 2024-11-01 ENCOUNTER — ANESTHESIA EVENT (OUTPATIENT)
Dept: SURGERY | Age: 39
End: 2024-11-01

## 2024-11-01 ENCOUNTER — ANESTHESIA (OUTPATIENT)
Dept: SURGERY | Age: 39
End: 2024-11-01

## 2024-11-01 ENCOUNTER — HOSPITAL ENCOUNTER (OUTPATIENT)
Age: 39
Discharge: HOME | End: 2024-11-01
Attending: OBSTETRICS & GYNECOLOGY | Admitting: OBSTETRICS & GYNECOLOGY

## 2024-11-01 VITALS
WEIGHT: 175.38 LBS | HEART RATE: 58 BPM | HEIGHT: 65 IN | OXYGEN SATURATION: 100 % | TEMPERATURE: 96.8 F | RESPIRATION RATE: 18 BRPM | SYSTOLIC BLOOD PRESSURE: 103 MMHG | DIASTOLIC BLOOD PRESSURE: 69 MMHG | BODY MASS INDEX: 29.22 KG/M2

## 2024-11-01 DIAGNOSIS — N85.6 INTRAUTERINE ADHESIONS: ICD-10-CM

## 2024-11-01 PROCEDURE — 81025 URINE PREGNANCY TEST: CPT | Performed by: OBSTETRICS & GYNECOLOGY

## 2024-11-01 PROCEDURE — 58559 HYSTEROSCOPY LYSIS: CPT

## 2024-11-01 PROCEDURE — 10002801 HB RX 250 W/O HCPCS: Performed by: ANESTHESIOLOGY

## 2024-11-01 PROCEDURE — 13000002 HB ANESTHESIA  GENERAL  S/U + 1ST 15 MIN: Performed by: OBSTETRICS & GYNECOLOGY

## 2024-11-01 PROCEDURE — 10002800 HB RX 250 W HCPCS: Performed by: ANESTHESIOLOGY

## 2024-11-01 PROCEDURE — 10004451 HB PACU RECOVERY 1ST 30 MINUTES: Performed by: OBSTETRICS & GYNECOLOGY

## 2024-11-01 PROCEDURE — 13000001 HB PHASE II RECOVERY EA 30 MINUTES: Performed by: OBSTETRICS & GYNECOLOGY

## 2024-11-01 PROCEDURE — 10006023 HB SUPPLY 272: Performed by: OBSTETRICS & GYNECOLOGY

## 2024-11-01 PROCEDURE — 10004452 HB PACU ADDL 30 MINUTES: Performed by: OBSTETRICS & GYNECOLOGY

## 2024-11-01 PROCEDURE — 88305 TISSUE EXAM BY PATHOLOGIST: CPT | Performed by: OBSTETRICS & GYNECOLOGY

## 2024-11-01 PROCEDURE — 13000037 HB COMPLEX CASE EACH ADD MINUTE: Performed by: OBSTETRICS & GYNECOLOGY

## 2024-11-01 PROCEDURE — 13000003 HB ANESTHESIA  GENERAL EA ADD MINUTE: Performed by: OBSTETRICS & GYNECOLOGY

## 2024-11-01 PROCEDURE — 13000036 HB COMPLEX  CASE S/U + 1ST 15 MIN: Performed by: OBSTETRICS & GYNECOLOGY

## 2024-11-01 PROCEDURE — 58561 HYSTEROSCOPY REMOVE MYOMA: CPT

## 2024-11-01 RX ORDER — DEXTROSE MONOHYDRATE 50 MG/ML
INJECTION, SOLUTION INTRAVENOUS CONTINUOUS PRN
Status: DISCONTINUED | OUTPATIENT
Start: 2024-11-01 | End: 2024-11-01 | Stop reason: HOSPADM

## 2024-11-01 RX ORDER — DEXAMETHASONE SODIUM PHOSPHATE 4 MG/ML
INJECTION, SOLUTION INTRA-ARTICULAR; INTRALESIONAL; INTRAMUSCULAR; INTRAVENOUS; SOFT TISSUE PRN
Status: DISCONTINUED | OUTPATIENT
Start: 2024-11-01 | End: 2024-11-01

## 2024-11-01 RX ORDER — ESTRADIOL 1 MG/1
1 TABLET ORAL 4 TIMES DAILY
Qty: 28 TABLET | Refills: 0 | Status: SHIPPED | OUTPATIENT
Start: 2024-11-01 | End: 2024-11-08

## 2024-11-01 RX ORDER — 0.9 % SODIUM CHLORIDE 0.9 %
10 VIAL (ML) INJECTION PRN
Status: DISCONTINUED | OUTPATIENT
Start: 2024-11-01 | End: 2024-11-01 | Stop reason: HOSPADM

## 2024-11-01 RX ORDER — DEXTROSE MONOHYDRATE 25 G/50ML
25 INJECTION, SOLUTION INTRAVENOUS PRN
Status: DISCONTINUED | OUTPATIENT
Start: 2024-11-01 | End: 2024-11-01 | Stop reason: HOSPADM

## 2024-11-01 RX ORDER — DOXYCYCLINE HYCLATE 100 MG
100 TABLET ORAL 2 TIMES DAILY
Qty: 14 TABLET | Refills: 0 | Status: SHIPPED | OUTPATIENT
Start: 2024-11-01 | End: 2024-11-08

## 2024-11-01 RX ORDER — SODIUM CHLORIDE, SODIUM LACTATE, POTASSIUM CHLORIDE, CALCIUM CHLORIDE 600; 310; 30; 20 MG/100ML; MG/100ML; MG/100ML; MG/100ML
INJECTION, SOLUTION INTRAVENOUS CONTINUOUS
Status: DISCONTINUED | OUTPATIENT
Start: 2024-11-01 | End: 2024-11-01 | Stop reason: HOSPADM

## 2024-11-01 RX ORDER — ACETAMINOPHEN 500 MG
1000 TABLET ORAL EVERY 6 HOURS PRN
Qty: 30 TABLET | Refills: 0 | Status: CANCELLED | OUTPATIENT
Start: 2024-11-01

## 2024-11-01 RX ORDER — LIDOCAINE HYDROCHLORIDE 10 MG/ML
INJECTION, SOLUTION INFILTRATION; PERINEURAL PRN
Status: DISCONTINUED | OUTPATIENT
Start: 2024-11-01 | End: 2024-11-01

## 2024-11-01 RX ORDER — 0.9 % SODIUM CHLORIDE 0.9 %
2 VIAL (ML) INJECTION EVERY 12 HOURS SCHEDULED
Status: DISCONTINUED | OUTPATIENT
Start: 2024-11-01 | End: 2024-11-01 | Stop reason: HOSPADM

## 2024-11-01 RX ORDER — MIDAZOLAM HYDROCHLORIDE 1 MG/ML
INJECTION, SOLUTION INTRAMUSCULAR; INTRAVENOUS PRN
Status: DISCONTINUED | OUTPATIENT
Start: 2024-11-01 | End: 2024-11-01

## 2024-11-01 RX ORDER — NICOTINE POLACRILEX 4 MG
30 LOZENGE BUCCAL
Status: DISCONTINUED | OUTPATIENT
Start: 2024-11-01 | End: 2024-11-01 | Stop reason: HOSPADM

## 2024-11-01 RX ORDER — IBUPROFEN 600 MG/1
600 TABLET, FILM COATED ORAL EVERY 6 HOURS PRN
Qty: 30 TABLET | Refills: 0 | Status: SHIPPED | OUTPATIENT
Start: 2024-11-01

## 2024-11-01 RX ORDER — PROPOFOL 10 MG/ML
INJECTION, EMULSION INTRAVENOUS PRN
Status: DISCONTINUED | OUTPATIENT
Start: 2024-11-01 | End: 2024-11-01

## 2024-11-01 RX ORDER — GLYCOPYRROLATE 0.2 MG/ML
INJECTION, SOLUTION INTRAMUSCULAR; INTRAVENOUS PRN
Status: DISCONTINUED | OUTPATIENT
Start: 2024-11-01 | End: 2024-11-01

## 2024-11-01 RX ORDER — ONDANSETRON 2 MG/ML
INJECTION INTRAMUSCULAR; INTRAVENOUS PRN
Status: DISCONTINUED | OUTPATIENT
Start: 2024-11-01 | End: 2024-11-01

## 2024-11-01 RX ORDER — CEFAZOLIN SODIUM 1 G/3ML
INJECTION, POWDER, FOR SOLUTION INTRAMUSCULAR; INTRAVENOUS PRN
Status: DISCONTINUED | OUTPATIENT
Start: 2024-11-01 | End: 2024-11-01

## 2024-11-01 RX ADMIN — DEXAMETHASONE SODIUM PHOSPHATE 4 MG: 4 INJECTION INTRA-ARTICULAR; INTRALESIONAL; INTRAMUSCULAR; INTRAVENOUS; SOFT TISSUE at 11:25

## 2024-11-01 RX ADMIN — MIDAZOLAM HYDROCHLORIDE 2 MG: 1 INJECTION, SOLUTION INTRAMUSCULAR; INTRAVENOUS at 10:48

## 2024-11-01 RX ADMIN — PROPOFOL 200 MG: 10 INJECTION, EMULSION INTRAVENOUS at 10:53

## 2024-11-01 RX ADMIN — HYDROMORPHONE HYDROCHLORIDE 0.2 MG: 1 INJECTION, SOLUTION INTRAMUSCULAR; INTRAVENOUS; SUBCUTANEOUS at 12:02

## 2024-11-01 RX ADMIN — FENTANYL CITRATE 25 MCG: 50 INJECTION INTRAMUSCULAR; INTRAVENOUS at 11:59

## 2024-11-01 RX ADMIN — GLYCOPYRROLATE 0.2 MG: 0.2 INJECTION, SOLUTION INTRAMUSCULAR; INTRAVENOUS at 10:48

## 2024-11-01 RX ADMIN — CEFAZOLIN 2 G: 1 INJECTION, POWDER, FOR SOLUTION INTRAMUSCULAR; INTRAVENOUS at 11:07

## 2024-11-01 RX ADMIN — FENTANYL CITRATE 25 MCG: 50 INJECTION INTRAMUSCULAR; INTRAVENOUS at 11:41

## 2024-11-01 RX ADMIN — KETOROLAC TROMETHAMINE 30 MG: 30 INJECTION, SOLUTION INTRAMUSCULAR at 11:25

## 2024-11-01 RX ADMIN — HYDROMORPHONE HYDROCHLORIDE 0.2 MG: 1 INJECTION, SOLUTION INTRAMUSCULAR; INTRAVENOUS; SUBCUTANEOUS at 12:34

## 2024-11-01 RX ADMIN — FENTANYL CITRATE 25 MCG: 50 INJECTION INTRAMUSCULAR; INTRAVENOUS at 11:47

## 2024-11-01 RX ADMIN — ONDANSETRON 4 MG: 2 INJECTION INTRAMUSCULAR; INTRAVENOUS at 11:25

## 2024-11-01 RX ADMIN — HYDROMORPHONE HYDROCHLORIDE 0.2 MG: 1 INJECTION, SOLUTION INTRAMUSCULAR; INTRAVENOUS; SUBCUTANEOUS at 12:42

## 2024-11-01 RX ADMIN — LIDOCAINE HYDROCHLORIDE 5 ML: 10 INJECTION, SOLUTION INFILTRATION; PERINEURAL at 10:53

## 2024-11-01 RX ADMIN — FENTANYL CITRATE 25 MCG: 50 INJECTION INTRAMUSCULAR; INTRAVENOUS at 11:14

## 2024-11-01 RX ADMIN — FENTANYL CITRATE 25 MCG: 50 INJECTION INTRAMUSCULAR; INTRAVENOUS at 10:53

## 2024-11-01 RX ADMIN — HYDROMORPHONE HYDROCHLORIDE 0.2 MG: 1 INJECTION, SOLUTION INTRAMUSCULAR; INTRAVENOUS; SUBCUTANEOUS at 12:07

## 2024-11-01 ASSESSMENT — PAIN SCALES - GENERAL
PAINLEVEL_OUTOF10: 7
PAINLEVEL_OUTOF10: 8
PAINLEVEL_OUTOF10: 8
PAINLEVEL_OUTOF10: 7
PAINLEVEL_OUTOF10: 8
PAINLEVEL_OUTOF10: 7
PAINLEVEL_OUTOF10: 0
PAINLEVEL_OUTOF10: 8
PAINLEVEL_OUTOF10: 7
PAINLEVEL_OUTOF10: 8
PAINLEVEL_OUTOF10: 8

## 2024-11-01 ASSESSMENT — ENCOUNTER SYMPTOMS: EXERCISE TOLERANCE: GOOD (>4 METS)

## 2024-11-04 ENCOUNTER — TELEPHONE (OUTPATIENT)
Dept: UROGYNECOLOGY | Age: 39
End: 2024-11-04

## 2024-11-05 LAB
ASR DISCLAIMER: NORMAL
CASE RPRT: NORMAL
CLINICAL INFO: NORMAL
PATH REPORT.FINAL DX SPEC: NORMAL
PATH REPORT.FINAL DX SPEC: NORMAL
PATH REPORT.GROSS SPEC: NORMAL

## 2024-11-06 ENCOUNTER — OFFICE VISIT (OUTPATIENT)
Dept: UROGYNECOLOGY | Age: 39
End: 2024-11-06

## 2024-11-06 VITALS
HEART RATE: 67 BPM | BODY MASS INDEX: 31.01 KG/M2 | SYSTOLIC BLOOD PRESSURE: 116 MMHG | WEIGHT: 175 LBS | DIASTOLIC BLOOD PRESSURE: 79 MMHG | HEIGHT: 63 IN | RESPIRATION RATE: 14 BRPM | TEMPERATURE: 97.6 F

## 2024-11-06 DIAGNOSIS — Z09 SURGICAL FOLLOW-UP CARE: Primary | ICD-10-CM

## 2024-11-06 PROCEDURE — 99024 POSTOP FOLLOW-UP VISIT: CPT | Performed by: PHYSICIAN ASSISTANT

## 2024-11-06 ASSESSMENT — PAIN SCALES - GENERAL: PAINLEVEL: 0

## (undated) DEVICE — HYSCP RGD TRUCLEAR ELITE SEAL LONG WORK CHNL ANG TIP DISP

## (undated) DEVICE — GLOVE SURG 6.5 PROTEXIS PI MIC LF CRM PF SMTH BEAD CUFF STRL

## (undated) DEVICE — Device

## (undated) DEVICE — GLOVE SURG 6.5 PROTEXIS LF BLUE PF SMTH BEAD CUFF INTLK STRL

## (undated) DEVICE — COVER RGD STRL LF LIGHT HNDL PLASTIC DISP GRN

## (undated) DEVICE — BLADE SHVR 2.9MM 357MM MINI TRUCLEAR TISS WDW LOCK CLSD CUT

## (undated) DEVICE — GLOVE SURG 6 PROTEXIS LF BLUE PF SMTH BEAD CUFF INTLK STRL

## (undated) DEVICE — TOWEL OR BLU 16X26IN 4PK

## (undated) DEVICE — GLOVE SURG 6 PROTEXIS PI MIC LF CRM PF SMTH BEAD CUFF STRL

## (undated) DEVICE — GLOVE SURG 7.5 PROTEXIS PI MIC LF CRM PF SMTH BEAD CUFF STRL

## (undated) DEVICE — SET TBG INFL HYSTEROLUX HYSCP

## (undated) DEVICE — CATHETER LUBRI-SIL 10FR 3CC FOLEY 2 WAY BLN SIL HYDROGEL

## (undated) DEVICE — CRADLE PSTN DEVON ARM FOAM LMI LF

## (undated) DEVICE — PAD XL 40X20X1IN PSTN THE PINK PAD 1 LIFT SHT BODY STRAP

## (undated) DEVICE — SET TBG OFLW HYSCP

## (undated) DEVICE — GOWN SURG XL L3 NONREINFORCE SET IN SLV STRL LF DISP BLUE

## (undated) DEVICE — GLOVE SURG 7.5 PROTEXIS LF BLUE PF SMTH BEAD CUFF INTLK STRL